# Patient Record
Sex: FEMALE | Race: WHITE | NOT HISPANIC OR LATINO | Employment: FULL TIME | URBAN - NONMETROPOLITAN AREA
[De-identification: names, ages, dates, MRNs, and addresses within clinical notes are randomized per-mention and may not be internally consistent; named-entity substitution may affect disease eponyms.]

---

## 2017-10-03 ENCOUNTER — TELEPHONE (OUTPATIENT)
Dept: FAMILY MEDICINE | Age: 57
End: 2017-10-03

## 2021-11-09 ENCOUNTER — TRANSCRIBE ORDERS (OUTPATIENT)
Dept: ADMINISTRATIVE | Facility: HOSPITAL | Age: 61
End: 2021-11-09

## 2021-11-09 DIAGNOSIS — N63.10 BREAST MASS, RIGHT: Primary | ICD-10-CM

## 2021-12-22 ENCOUNTER — HOSPITAL ENCOUNTER (OUTPATIENT)
Dept: MAMMOGRAPHY | Facility: HOSPITAL | Age: 61
Discharge: HOME | End: 2021-12-22

## 2021-12-22 DIAGNOSIS — N63.10 BREAST MASS, RIGHT: ICD-10-CM

## 2022-01-05 ENCOUNTER — HOSPITAL ENCOUNTER (OUTPATIENT)
Dept: MAMMOGRAPHY | Facility: HOSPITAL | Age: 62
Discharge: HOME | End: 2022-01-05

## 2022-01-05 ENCOUNTER — HOSPITAL ENCOUNTER (OUTPATIENT)
Dept: ULTRASOUND IMAGING | Facility: HOSPITAL | Age: 62
Discharge: HOME | End: 2022-01-05

## 2022-01-05 DIAGNOSIS — N63.10 BREAST MASS, RIGHT: ICD-10-CM

## 2022-01-05 PROCEDURE — G0279 TOMOSYNTHESIS, MAMMO: HCPCS

## 2022-01-05 PROCEDURE — 76642 ULTRASOUND BREAST LIMITED: CPT | Performed by: RADIOLOGY

## 2022-01-05 PROCEDURE — 77061 BREAST TOMOSYNTHESIS UNI: CPT | Performed by: RADIOLOGY

## 2022-01-05 PROCEDURE — 77065 DX MAMMO INCL CAD UNI: CPT

## 2022-01-05 PROCEDURE — 76642 ULTRASOUND BREAST LIMITED: CPT

## 2022-01-05 PROCEDURE — 77065 DX MAMMO INCL CAD UNI: CPT | Performed by: RADIOLOGY

## 2023-03-02 ENCOUNTER — TRANSCRIBE ORDERS (OUTPATIENT)
Dept: ADMINISTRATIVE | Facility: HOSPITAL | Age: 63
End: 2023-03-02
Payer: COMMERCIAL

## 2023-03-02 DIAGNOSIS — Z12.31 ENCOUNTER FOR SCREENING MAMMOGRAM FOR MALIGNANT NEOPLASM OF BREAST: Primary | ICD-10-CM

## 2023-04-10 ENCOUNTER — HOSPITAL ENCOUNTER (OUTPATIENT)
Dept: MAMMOGRAPHY | Facility: HOSPITAL | Age: 63
Discharge: HOME | End: 2023-04-10
Admitting: NURSE PRACTITIONER
Payer: MEDICARE

## 2023-04-10 DIAGNOSIS — Z12.31 ENCOUNTER FOR SCREENING MAMMOGRAM FOR MALIGNANT NEOPLASM OF BREAST: ICD-10-CM

## 2023-04-10 PROCEDURE — 77063 BREAST TOMOSYNTHESIS BI: CPT

## 2023-04-10 PROCEDURE — 77067 SCR MAMMO BI INCL CAD: CPT | Performed by: RADIOLOGY

## 2023-04-10 PROCEDURE — 77067 SCR MAMMO BI INCL CAD: CPT

## 2023-04-10 PROCEDURE — 77063 BREAST TOMOSYNTHESIS BI: CPT | Performed by: RADIOLOGY

## 2023-06-06 ENCOUNTER — OFFICE VISIT (OUTPATIENT)
Dept: CARDIOLOGY | Facility: CLINIC | Age: 63
End: 2023-06-06
Payer: MEDICARE

## 2023-06-06 VITALS
SYSTOLIC BLOOD PRESSURE: 132 MMHG | BODY MASS INDEX: 27.35 KG/M2 | DIASTOLIC BLOOD PRESSURE: 70 MMHG | HEART RATE: 70 BPM | OXYGEN SATURATION: 97 % | HEIGHT: 66 IN | WEIGHT: 170.2 LBS

## 2023-06-06 DIAGNOSIS — E78.2 MIXED HYPERLIPIDEMIA: Chronic | ICD-10-CM

## 2023-06-06 DIAGNOSIS — I25.84 CORONARY ARTERY CALCIFICATION: Primary | Chronic | ICD-10-CM

## 2023-06-06 DIAGNOSIS — I25.10 CORONARY ARTERY CALCIFICATION: Primary | Chronic | ICD-10-CM

## 2023-06-06 DIAGNOSIS — I10 PRIMARY HYPERTENSION: Chronic | ICD-10-CM

## 2023-06-06 PROCEDURE — 99204 OFFICE O/P NEW MOD 45 MIN: CPT | Performed by: INTERNAL MEDICINE

## 2023-06-06 PROCEDURE — 93000 ELECTROCARDIOGRAM COMPLETE: CPT | Performed by: INTERNAL MEDICINE

## 2023-06-06 RX ORDER — LISINOPRIL 5 MG/1
10 TABLET ORAL DAILY
COMMUNITY
Start: 2023-02-14

## 2023-06-06 RX ORDER — MULTIVIT WITH MINERALS/LUTEIN
3000 TABLET ORAL DAILY
COMMUNITY

## 2023-06-06 RX ORDER — CHLORAL HYDRATE 500 MG
1000 CAPSULE ORAL
COMMUNITY
End: 2023-06-06

## 2023-06-06 RX ORDER — OMEPRAZOLE 20 MG/1
20 CAPSULE, DELAYED RELEASE ORAL DAILY
COMMUNITY
Start: 2023-05-15

## 2023-06-06 RX ORDER — ATORVASTATIN CALCIUM 20 MG/1
20 TABLET, FILM COATED ORAL NIGHTLY
Qty: 30 TABLET | Refills: 11 | Status: SHIPPED | OUTPATIENT
Start: 2023-06-06

## 2023-06-06 NOTE — PROGRESS NOTES
[facility] CARDIOLOGY [facility]    New Patient Office Visit    Patient Name: Yoli Granger  : 1960   MRN: [id number]   Care Team: Patient Care Team:  Padmini Fabian APRN as PCP - General (Family Medicine)    Chief Complaint   Patient presents with    [facility]     New Patient       HPI: Yoli Granger is a 63 y.o. female with a history of retention, tobacco abuse in remission, hyperlipidemia who presents today for artery calcification seen on low-dose CT done for lung cancer screening.  History had undergone this study in May of this year and was referred by her primary care.  She denies any anginal type chest pain but does report some dyspnea on exertion at times.  She does have a significant smoking history, having smoked for 45 years.  However, she does report that she has quit about 3 weeks ago with the assistance of nicotine patches.    Subjective   Review of Systems   Constitutional:  Negative for activity change and fatigue.   Respiratory:  Positive for shortness of breath. Negative for chest tightness.    Cardiovascular:  Negative for chest pain, palpitations and leg swelling.     Past Medical History:   Diagnosis Date    Atherosclerosis of coronary artery     Body mass index (BMI) 28.0-28.9, adult     Chronic GERD     Cigarette nicotine dependence     Dyslipidemia     Essential hypertension     Hyperlipidemia      Past Surgical History:   Procedure Laterality Date    TUBAL ABDOMINAL LIGATION       Social History     Socioeconomic History    Marital status: Single   Tobacco Use    Smoking status: Former     Types: Cigarettes     Quit date: 5/15/2023     Years since quittin.0    Smokeless tobacco: Never   Substance and Sexual Activity    Alcohol use: Defer    Drug use: Defer    Sexual activity: Defer     Family History   Problem Relation Age of Onset    Ovarian cancer Mother         age unknown    Breast cancer Neg Hx          Current Outpatient Medications:      "ascorbic acid (VITAMIN C) 1000 MG tablet, 3 tablets Daily., Disp: , Rfl:     ASPIRIN 81 PO, 81 mg 3 (Three) Times a Week., Disp: , Rfl:     lisinopril (PRINIVIL,ZESTRIL) 5 MG tablet, Take 2 tablets by mouth Daily., Disp: , Rfl:     Omega-3 Fatty Acids (fish oil) 1000 MG capsule capsule, Take 1 capsule by mouth Daily With Breakfast., Disp: , Rfl:     omeprazole (priLOSEC) 20 MG capsule, Take 1 capsule by mouth Daily., Disp: , Rfl:   No Known Allergies    Objective     Vitals:    06/06/23 1253   BP: 132/70   BP Location: Right arm   Patient Position: Sitting   Pulse: 70   SpO2: 97%   Weight: 77.2 kg (170 lb 3.2 oz)   Height: 167.6 cm (66\")     Body mass index is 27.47 kg/m².  Gen: well developed, sitting up on exam table, comfortable appearing  HEENT: MMM, sclera anicteric, conjunctiva normal, no carotid bruits  CV: regular rate, regular rhythm, no murmurs or rubs, normal S1, S2. 2+ radial and PT pulses  Pulm: RA, normal work of breathing, no wheezes, rales, rhonchi  Abd: soft, non-tender, non-distended  Ext: normal bulk for age, normal tone, no dependent edema  Neuro: alert, oriented, face symmetrical, moving all extremities well  Psych: normal mood, appropriate affect    Most recent PCP note, imaging tests, and labs reviewed.    /17/23 CT low-dose lung screening pression: No suspicious pulmonary nodule or mass.  Stable emphysematous changes.  Coronary and aortic atherosclerosis.  Moderate to large hiatal hernia.  Stable 6 mm sclerotic lesion in T12 vertebral body in keeping with a bone island.  Lung RADS category 1S, negative.    Labs:  /18/23  Cholesterol 181, HDL 48, triglycerides 115, , VLDL 23  TSH 2.68  Glucose 85, BUN 15, creatinine 0.61, sodium 141, potassium 4.1, chloride 107, CO2 27  T. bili 1.3, alk phos 3 1, AST 21, ALT 15  WBC 6.8, hemoglobin 13.6, platelets 231        ECG 12 Lead    Date/Time: 6/6/2023 2:33 PM  Performed by: Milton Cm MD  Authorized by: Milton Cm MD   Comparison: " not compared with previous ECG   Previous ECG: no previous ECG available  Rhythm: sinus rhythm  Rate: normal  BPM: 67  Conduction: conduction normal  ST Segments: ST segments normal  T Waves: T waves normal  QRS axis: normal  Other: no other findings    Clinical impression: normal ECG        Advance Care Planning   ACP discussion was declined by the patient. Patient does not have an advance directive, declines further assistance.       Assessment & Plan       ICD-10-CM ICD-9-CM   1. Coronary artery calcification  I25.10 414.00    I25.84 414.4   2. Primary hypertension  I10 401.9   3. Mixed hyperlipidemia  E78.2 272.2      Coronary artery calcification   - Immediate risk by PCE calculator likely underestimated due to observed coronary artery calcium.   - Would continue aspirin and start moderate intensity statin with atorvastatin 20 mg at bedtime    Hypertension   - Acceptably controlled today, long-term goal of less than 130/90   - Lisinopril, dose was recently increased    Tobacco use in remission   - Congratulated the patient on her recent quitting and emphasized the beneficial effects on her vasculature if she is able to maintain since    Return in about 1 year (around 6/6/2024).    GABRIEL Cm MD  06/06/23    [facility] Cardiology  [address] [address] [phone]

## 2024-05-21 ENCOUNTER — TRANSCRIBE ORDERS (OUTPATIENT)
Dept: ADMINISTRATIVE | Facility: HOSPITAL | Age: 64
End: 2024-05-21
Payer: MEDICARE

## 2024-05-21 DIAGNOSIS — Z12.31 VISIT FOR SCREENING MAMMOGRAM: Primary | ICD-10-CM

## 2024-05-28 ENCOUNTER — HOSPITAL ENCOUNTER (OUTPATIENT)
Facility: HOSPITAL | Age: 64
Discharge: HOME | End: 2024-05-28
Admitting: NURSE PRACTITIONER
Payer: MEDICARE

## 2024-05-28 DIAGNOSIS — Z12.31 VISIT FOR SCREENING MAMMOGRAM: ICD-10-CM

## 2024-05-28 PROCEDURE — 77067 SCR MAMMO BI INCL CAD: CPT

## 2024-05-28 PROCEDURE — 77063 BREAST TOMOSYNTHESIS BI: CPT

## 2024-06-10 ENCOUNTER — HOSPITAL ENCOUNTER (OUTPATIENT)
Facility: HOSPITAL | Age: 64
Discharge: HOME | End: 2024-06-10
Payer: MEDICARE

## 2024-06-10 DIAGNOSIS — R92.8 ABNORMAL MAMMOGRAM: ICD-10-CM

## 2024-06-10 PROCEDURE — G0279 TOMOSYNTHESIS, MAMMO: HCPCS

## 2024-06-10 PROCEDURE — 76642 ULTRASOUND BREAST LIMITED: CPT

## 2024-06-10 PROCEDURE — 77065 DX MAMMO INCL CAD UNI: CPT | Performed by: RADIOLOGY

## 2024-06-10 PROCEDURE — 76642 ULTRASOUND BREAST LIMITED: CPT | Performed by: RADIOLOGY

## 2024-06-10 PROCEDURE — G0279 TOMOSYNTHESIS, MAMMO: HCPCS | Performed by: RADIOLOGY

## 2024-06-10 PROCEDURE — 77065 DX MAMMO INCL CAD UNI: CPT

## 2024-08-19 RX ORDER — ATORVASTATIN CALCIUM 20 MG/1
20 TABLET, FILM COATED ORAL NIGHTLY
Qty: 30 TABLET | Refills: 2 | Status: SHIPPED | OUTPATIENT
Start: 2024-08-19

## 2024-08-19 NOTE — TELEPHONE ENCOUNTER
Caller: Yoli Granger    Relationship: Self    Best call back number: [phone]    Requested Prescriptions:   Requested Prescriptions     Pending Prescriptions Disp Refills    atorvastatin (LIPITOR) 20 MG tablet 30 tablet 11     Sig: Take 1 tablet by mouth Every Night.        Pharmacy where request should be sent: [facility] [address] [place] [address] [phone] [facility] [phone] FX     Last office visit with prescribing clinician: 6/6/2023   Last telemedicine visit with prescribing clinician: Visit date not found   Next office visit with prescribing clinician: 11/5/2024     Does the patient have less than a 3 day supply:  [x] Yes  [] No    Would you like a call back once the refill request has been completed: [] Yes [] No    If the office needs to give you a call back, can they leave a voicemail: [] Yes [] No    Hayden Kincaid Rep   08/19/24 09:41 EDT

## 2024-11-05 ENCOUNTER — OFFICE VISIT (OUTPATIENT)
Dept: CARDIOLOGY | Facility: CLINIC | Age: 64
End: 2024-11-05
Payer: MEDICARE

## 2024-11-05 VITALS
BODY MASS INDEX: 28.64 KG/M2 | OXYGEN SATURATION: 99 % | SYSTOLIC BLOOD PRESSURE: 136 MMHG | HEART RATE: 71 BPM | HEIGHT: 66 IN | WEIGHT: 178.2 LBS | DIASTOLIC BLOOD PRESSURE: 82 MMHG

## 2024-11-05 DIAGNOSIS — E78.2 MIXED HYPERLIPIDEMIA: ICD-10-CM

## 2024-11-05 DIAGNOSIS — I10 PRIMARY HYPERTENSION: ICD-10-CM

## 2024-11-05 DIAGNOSIS — I25.10 CORONARY ARTERY CALCIFICATION SEEN ON CT SCAN: Primary | ICD-10-CM

## 2024-11-05 PROCEDURE — 99213 OFFICE O/P EST LOW 20 MIN: CPT | Performed by: INTERNAL MEDICINE

## 2024-11-05 PROCEDURE — 3079F DIAST BP 80-89 MM HG: CPT | Performed by: INTERNAL MEDICINE

## 2024-11-05 PROCEDURE — G2211 COMPLEX E/M VISIT ADD ON: HCPCS | Performed by: INTERNAL MEDICINE

## 2024-11-05 PROCEDURE — 3075F SYST BP GE 130 - 139MM HG: CPT | Performed by: INTERNAL MEDICINE

## 2024-11-05 RX ORDER — ROSUVASTATIN CALCIUM 5 MG/1
5 TABLET, COATED ORAL DAILY
Qty: 30 TABLET | Refills: 11 | Status: SHIPPED | OUTPATIENT
Start: 2024-11-05

## 2024-11-05 NOTE — PROGRESS NOTES
[facility] CARDIOLOGY    Established Patient Office Visit    Patient Name: Yoli Granger  : 1960   MRN: [id number]   Care Team: Patient Care Team:  Padmini Fabian APRN as PCP - General (Family Medicine)    Chief Complaint   Patient presents with    Coronary artery calcification       HPI: Yoli Granger is a 64 y.o. female with a history of retention, tobacco abuse in remission, hyperlipidemia who presents today for routine follow-up of artery calcification seen on low-dose CT done for lung cancer screening.  At her initial visit she was prescribed atorvastatin and aspirin but has stopped aspirin due to petechiae and stopped statin due to headaches.  She has been on fish oil for her cholesterol which she says her numbers have improved.  She denies any interval symptoms of chest pain or dyspnea on exertion.    Subjective   Review of Systems   Constitutional:  Negative for activity change and fatigue.   Respiratory:  Negative for chest tightness.    Cardiovascular:  Negative for chest pain, palpitations and leg swelling.       Past Medical History:   Diagnosis Date    Atherosclerosis of coronary artery     Body mass index (BMI) 28.0-28.9, adult     Chronic GERD     Cigarette nicotine dependence     Dyslipidemia     Essential hypertension     Hyperlipidemia      Past Surgical History:   Procedure Laterality Date    TUBAL ABDOMINAL LIGATION       Social History     Socioeconomic History    Marital status: Single   Tobacco Use    Smoking status: Former     Current packs/day: 0.00     Types: Cigarettes     Quit date: 5/15/2023     Years since quittin.4    Smokeless tobacco: Never   Substance and Sexual Activity    Alcohol use: Defer    Drug use: Never    Sexual activity: Not Currently     Family History   Problem Relation Age of Onset    Ovarian cancer Mother         age unknown    Breast cancer Neg Hx          Current Outpatient Medications:     Ascorbic Acid (VITAMIN C PO), Take  "2,000 mg by mouth Daily. Just during the winter time, Disp: , Rfl:     lisinopril (PRINIVIL,ZESTRIL) 10 MG tablet, Take 1 tablet by mouth Daily., Disp: , Rfl:     Omega-3 Fatty Acids (FISH OIL PO), Take 2,400 Units by mouth Daily., Disp: , Rfl:     omeprazole (priLOSEC) 20 MG capsule, Take 1 capsule by mouth Daily., Disp: , Rfl:     rosuvastatin (CRESTOR) 5 MG tablet, Take 1 tablet by mouth Daily., Disp: 30 tablet, Rfl: 11  No Known Allergies    Objective     Vitals:    11/05/24 1136 11/05/24 1209   BP: 152/80 136/82   BP Location: Left arm    Patient Position: Sitting    Pulse: 71    SpO2: 99%    Weight: 80.8 kg (178 lb 3.2 oz)    Height: 167.6 cm (66\")    Body mass index is 28.76 kg/m².  Gen: well developed, sitting up on exam table, comfortable appearing  HEENT: MMM, sclera anicteric, conjunctiva normal, no carotid bruits  CV: regular rate, regular rhythm, no murmurs or rubs, normal S1, S2. 2+ radial and PT pulses  Pulm: RA, normal work of breathing, no wheezes, rales, rhonchi  Ext: normal bulk for age, normal tone, no dependent edema  Neuro: alert, oriented, face symmetrical, moving all extremities well  Psych: normal mood, appropriate affect    Most recent PCP note, imaging tests, and labs reviewed.    5/17/23 CT low-dose lung screening pression: No suspicious pulmonary nodule or mass.  Stable emphysematous changes.  Coronary and aortic atherosclerosis.  Moderate to large hiatal hernia.  Stable 6 mm sclerotic lesion in T12 vertebral body in keeping with a bone island.  Lung RADS category 1S, negative.    Labs:  /18/23  Cholesterol 181, HDL 48, triglycerides 115, , VLDL 23  TSH 2.68  Glucose 85, BUN 15, creatinine 0.61, sodium 141, potassium 4.1, chloride 107, CO2 27  T. bili 1.3, alk phos 3 1, AST 21, ALT 15  WBC 6.8, hemoglobin 13.6, platelets 231      Procedures    Advance Care Planning   ACP discussion was declined by the patient. Patient does not have an advance directive, declines further " assistance.       Assessment & Plan       ICD-10-CM ICD-9-CM   1. Coronary artery calcification seen on CT scan  I25.10 414.00   2. Primary hypertension  I10 401.9   3. Mixed hyperlipidemia  E78.2 272.2        Coronary artery calcification   - Immediate risk by PCE calculator likely underestimated due to observed coronary artery calcium.   - Change to rosuvastatin 5 mg daily   - Patient self discontinued aspirin due to petechiae    Hypertension   - Acceptably controlled today on recheck, doing well at home with readings in the 120-130s largely   - Current regimen    Tobacco use in remission    Return in about 1 year (around 11/5/2025).    GABRIEL Cm MD  11/05/24    [facility] Cardiology  [address] [address] [phone]

## 2024-11-14 RX ORDER — ATORVASTATIN CALCIUM 20 MG/1
20 TABLET, FILM COATED ORAL NIGHTLY
Qty: 90 TABLET | Refills: 0 | OUTPATIENT
Start: 2024-11-14

## 2025-04-15 ENCOUNTER — TRANSCRIBE ORDERS (OUTPATIENT)
Dept: ADMINISTRATIVE | Facility: HOSPITAL | Age: 65
End: 2025-04-15
Payer: MEDICARE

## 2025-04-15 DIAGNOSIS — Z12.31 VISIT FOR SCREENING MAMMOGRAM: Primary | ICD-10-CM

## 2025-05-24 LAB
NCCN CRITERIA FLAG: ABNORMAL
TYRER CUZICK SCORE: 5.6

## 2025-05-29 ENCOUNTER — HOSPITAL ENCOUNTER (OUTPATIENT)
Facility: HOSPITAL | Age: 65
Discharge: HOME | End: 2025-05-29
Admitting: NURSE PRACTITIONER
Payer: MEDICARE

## 2025-05-29 DIAGNOSIS — Z12.31 VISIT FOR SCREENING MAMMOGRAM: ICD-10-CM

## 2025-05-29 PROCEDURE — 77067 SCR MAMMO BI INCL CAD: CPT

## 2025-05-29 PROCEDURE — 77063 BREAST TOMOSYNTHESIS BI: CPT

## 2025-06-01 ENCOUNTER — RESULTS FOLLOW-UP (OUTPATIENT)
Facility: HOSPITAL | Age: 65
End: 2025-06-01
Payer: MEDICARE

## 2025-06-06 ENCOUNTER — DOCUMENTATION (OUTPATIENT)
Dept: GENETICS | Facility: HOSPITAL | Age: 65
End: 2025-06-06
Payer: MEDICARE